# Patient Record
Sex: FEMALE | Race: WHITE | Employment: UNEMPLOYED | URBAN - NONMETROPOLITAN AREA
[De-identification: names, ages, dates, MRNs, and addresses within clinical notes are randomized per-mention and may not be internally consistent; named-entity substitution may affect disease eponyms.]

---

## 2018-11-08 ENCOUNTER — OFFICE VISIT (OUTPATIENT)
Dept: SURGERY | Age: 38
End: 2018-11-08
Payer: MEDICARE

## 2018-11-08 VITALS
RESPIRATION RATE: 16 BRPM | WEIGHT: 155.2 LBS | DIASTOLIC BLOOD PRESSURE: 70 MMHG | SYSTOLIC BLOOD PRESSURE: 118 MMHG | OXYGEN SATURATION: 97 % | HEIGHT: 66 IN | TEMPERATURE: 99.1 F | BODY MASS INDEX: 24.94 KG/M2 | HEART RATE: 108 BPM

## 2018-11-08 DIAGNOSIS — R22.31 AXILLARY MASS, RIGHT: Primary | ICD-10-CM

## 2018-11-08 PROCEDURE — 99242 OFF/OP CONSLTJ NEW/EST SF 20: CPT | Performed by: SURGERY

## 2018-11-08 PROCEDURE — G8427 DOCREV CUR MEDS BY ELIG CLIN: HCPCS | Performed by: SURGERY

## 2018-11-08 PROCEDURE — G8419 CALC BMI OUT NRM PARAM NOF/U: HCPCS | Performed by: SURGERY

## 2018-11-08 PROCEDURE — G8484 FLU IMMUNIZE NO ADMIN: HCPCS | Performed by: SURGERY

## 2018-11-08 NOTE — PROGRESS NOTES
Guillermo Ward MD   General Surgery  New Patient Evaluation in Office  Pt Name: Nina Salvador  Date of Birth 1980   Today's Date: 2018  Medical Record Number: [id number]  Referring Provider: Iza Leong, *  Primary Care Provider:No primary care provider on file. Chief Complaint:  Chief Complaint   Patient presents with    Surgical Consult     new pt-ref. Amanda-rt breast mass-       ASSESSMENT      1. Axillary mass, right         PLANS      1. Breast and axillary imaging reviewed. 2.  Patient given the options of proceeding with surgical excision versus ultrasound-guided needle core biopsy as I suspect it is a benign lesion. Clinically consistent with a likely fibroadenoma. If biopsy is benign patient wishes to continue with observation without excision. 3.  Follow-up office after ultrasound guided biopsy. Patient wishes to schedule at [address] [place] [address]'s [facility]. Emelia Duke is a 41y.o. year old female who is presenting today in the office for e she was referred by her primary care provider, TONI Patel, for  evaluation of a right axillary mass. She stated she first felt the mass about 6 months ago. She had recent imaging including mammogram and ultrasound. It revealed a solid 2 cm mass in the right axilla. She has a smooth well rounded mobile mass in the right axilla there are no palpable enlarged lymph nodes. It had a solid appearance not in appearance of the lymph node. She has no other palpable enlarged nodes in the supraclavicular axillary or cervical area. She has had 2 pregnancies and 2 children by  section. She takes no exogenous hormones. She has no family history of breast or ovarian cancer. Her father was recently diagnosed with prostate cancer. She has no other constitutional symptoms and feels well. She's had no breast pain or nipple discharge. The mass in the right axilla is not painful.   She denies any nipple bruise/bleed easily. Psychiatric/Behavioral: Negative for behavioral problems, confusion and suicidal ideas. OBJECTIVE     /70 (Site: Right Upper Arm, Position: Sitting, Cuff Size: Medium Adult)   Pulse 108   Temp 99.1 °F (37.3 °C) (Tympanic)   Resp 16   Ht 5' 6\" (1.676 m)   Wt 155 lb 3.2 oz (70.4 kg)   LMP 11/08/2018   SpO2 97%   BMI 25.05 kg/m²     Physical Exam   Constitutional: She is oriented to person, place, and time. She appears well-developed and well-nourished. HENT:   Head: Normocephalic and atraumatic. Mouth/Throat: Oropharynx is clear and moist.   Eyes: Pupils are equal, round, and reactive to light. EOM are normal. No scleral icterus. Neck: No JVD present. No tracheal deviation present. Cardiovascular: Normal rate and normal heart sounds. Pulmonary/Chest: No respiratory distress. She has no wheezes. She has no rales. She exhibits no tenderness. Right breast exhibits no inverted nipple, no mass, no nipple discharge, no skin change and no tenderness. Left breast exhibits no inverted nipple, no mass, no nipple discharge, no skin change and no tenderness. Abdominal: She exhibits no distension and no mass. There is no tenderness. Musculoskeletal: She exhibits no edema or deformity. Lymphadenopathy:     She has no cervical adenopathy. Right cervical: No superficial cervical, no deep cervical and no posterior cervical adenopathy present. Left cervical: No superficial cervical, no deep cervical and no posterior cervical adenopathy present. Right axillary: No pectoral and no lateral adenopathy present. Left axillary: No pectoral and no lateral adenopathy present. Neurological: She is alert and oriented to person, place, and time. No cranial nerve deficit. Skin: Skin is warm and dry. No rash noted. Psychiatric: She has a normal mood and affect.  Her behavior is normal. Thought content normal.       No results found for: WBC, HGB, HCT, PLT, CHOL, TRIG, HDL, LDLDIRECT, ALT, AST, NA, K, CL, CREATININE, BUN, CO2, TSH, PSA, INR, GLUF, LABA1C, LABMICR

## 2018-11-08 NOTE — LETTER
[id number], located within [facility]  [address]  [address] [facility]  Phone: [phone]  Fax: [phone]    Isabelle Arguello MD        November 9, 2018    Patient: Jose Urias   MR Number: [id number]   YOB: 1980   Date of Visit: 11/8/2018     Dear Chaparro Crowley     I recently saw your patient for evaluation of a clinically benign right axillary mass. Below are the relevant portions of my assessment and plan of care. 1. Axillary mass, right           1. Breast and axillary imaging reviewed. 2.  Patient given the options of proceeding with surgical excision versus ultrasound-guided needle core biopsy as I suspect it is a benign lesion. Clinically consistent with a likely fibroadenoma. If biopsy is benign patient wishes to continue with observation without excision. 3.  Follow-up office after ultrasound guided biopsy. Patient wishes to schedule at [address] [place] [address] [facility] [facility]. If you have questions, please do not hesitate to call me. I look forward to following Huan Fonseca along with you.     Sincerely,          Isabelle Arguello MD

## 2018-11-09 ASSESSMENT — ENCOUNTER SYMPTOMS
COUGH: 0
COLOR CHANGE: 0
BLOOD IN STOOL: 0
VOMITING: 0
WHEEZING: 0
VOICE CHANGE: 0
ABDOMINAL PAIN: 0
TROUBLE SWALLOWING: 0
SHORTNESS OF BREATH: 0
NAUSEA: 0
SORE THROAT: 0

## 2018-11-19 ENCOUNTER — HOSPITAL ENCOUNTER (OUTPATIENT)
Dept: WOMENS IMAGING | Age: 38
Discharge: HOME | End: 2018-11-19
Payer: MEDICARE

## 2018-11-19 DIAGNOSIS — Z00.6 ENCOUNTER FOR EXAMINATION FOR NORMAL COMPARISON OR CONTROL IN CLINICAL RESEARCH PROGRAM: ICD-10-CM

## 2018-11-19 PROCEDURE — 3209999900 MAM COMPARISON OF OUTSIDE IMAGES

## 2018-11-20 ENCOUNTER — HOSPITAL ENCOUNTER (OUTPATIENT)
Dept: WOMENS IMAGING | Age: 38
Discharge: HOME | End: 2018-11-20
Payer: MEDICARE

## 2018-11-20 VITALS
DIASTOLIC BLOOD PRESSURE: 84 MMHG | TEMPERATURE: 98.2 F | HEART RATE: 85 BPM | SYSTOLIC BLOOD PRESSURE: 127 MMHG | RESPIRATION RATE: 14 BRPM

## 2018-11-20 DIAGNOSIS — R22.31 AXILLARY MASS, RIGHT: ICD-10-CM

## 2018-11-20 PROCEDURE — C1894 INTRO/SHEATH, NON-LASER: HCPCS

## 2018-11-20 PROCEDURE — 2709999900 HC NON-CHARGEABLE SUPPLY

## 2018-11-20 PROCEDURE — 76942 ECHO GUIDE FOR BIOPSY: CPT

## 2018-11-20 PROCEDURE — A4648 IMPLANTABLE TISSUE MARKER: HCPCS

## 2018-11-20 PROCEDURE — 88305 TISSUE EXAM BY PATHOLOGIST: CPT

## 2018-11-20 PROCEDURE — 38505 NEEDLE BIOPSY LYMPH NODES: CPT

## 2018-11-20 NOTE — PROGRESS NOTES
Breast Biopsy Flowsheet/Post-Operative Care    Date of Procedure: 11/20/2018  Physician: Dr. Aidee Smyth  Technologist: Matthew RT (R)(M)    Biopsy:ultrasound guided breast biopsy  Lesion type: [x] Palpable     [] Non-palpable  Breast: right    Clock face position: Site #1:  Right axillary tail     Primary Method of Detection: [x] Palpation    [] Mammogram    [] Ultrasound   [x] Mass:      [] Microcalcifications:   [] Pleomorphic   [] Increasing Number   Distribution:  [] Grouped [] Linear [] Regional    Asymmetry: asymmetric    Biopsy Method:   sertera:    Site #1     Gauge:  14     # of Passes: 4     Clip:   [] \"Ribbon\"   [] \"O\"     [] \"M\"      [] \"X\"      [x] \"tribell\"       [] \"Bowtie\"  [] \"U\"        Pre-Op Assessment: (BI-RADS)   [] 3. Probably Benign   [x] 4. Suspicious Abnormality   [] 5. Highly Suggestive of Malignancy      Patient Tolerated Procedure: good  Complications: minimal bleeding  Comments: N/A    Post Operative Care  Steri strips: Yes  Dressing: [] Pressure Dressing   [x] Gauze. Tape   Ice Applied to Site:  Yes  Evidence of Bleeding:  No    Pain Verbalized: No      Written Discharge Instructions: Yes  Condition at Discharge: good  Time of Discharge: [address]    Electronically signed by Desire Jennings RN on 11/20/2018 at 9:41 AM

## 2018-11-20 NOTE — PROGRESS NOTES
Formulation and discussion of sedation / procedure plans, risks, benefits, side effects and alternatives with patient and/or responsible adult completed.     Electronically signed by Radha Pyle MD on 11/20/2018 at 8:30 AM

## 2019-06-25 ENCOUNTER — HOSPITAL ENCOUNTER (OUTPATIENT)
Age: 39
Setting detail: SPECIMEN
Discharge: HOME | End: 2019-06-25
Payer: MEDICARE

## 2019-06-25 LAB
ABSOLUTE EOS #: 0.14 K/UL (ref 0–0.44)
ABSOLUTE IMMATURE GRANULOCYTE: 0.03 K/UL (ref 0–0.3)
ABSOLUTE LYMPH #: 2.2 K/UL (ref 1.1–3.7)
ABSOLUTE MONO #: 0.72 K/UL (ref 0.1–1.2)
ALBUMIN SERPL-MCNC: 4.6 G/DL (ref 3.5–5.2)
ALBUMIN/GLOBULIN RATIO: 1.7 (ref 1–2.5)
ALP BLD-CCNC: 68 U/L (ref 35–104)
ALT SERPL-CCNC: 17 U/L (ref 5–33)
ANION GAP SERPL CALCULATED.3IONS-SCNC: 12 MMOL/L (ref 9–17)
AST SERPL-CCNC: 23 U/L
BASOPHILS # BLD: 1 % (ref 0–2)
BASOPHILS ABSOLUTE: 0.09 K/UL (ref 0–0.2)
BILIRUB SERPL-MCNC: 0.19 MG/DL (ref 0.3–1.2)
BUN BLDV-MCNC: 10 MG/DL (ref 6–20)
BUN/CREAT BLD: ABNORMAL (ref 9–20)
C-REACTIVE PROTEIN: 1 MG/L (ref 0–5)
CALCIUM SERPL-MCNC: 9.2 MG/DL (ref 8.6–10.4)
CHLORIDE BLD-SCNC: 105 MMOL/L (ref 98–107)
CO2: 22 MMOL/L (ref 20–31)
CREAT SERPL-MCNC: 0.61 MG/DL (ref 0.5–0.9)
DIFFERENTIAL TYPE: ABNORMAL
EOSINOPHILS RELATIVE PERCENT: 2 % (ref 1–4)
FOLLICLE STIMULATING HORMONE: 6.1 U/L (ref 1.7–21.5)
GFR AFRICAN AMERICAN: >60 ML/MIN
GFR NON-AFRICAN AMERICAN: >60 ML/MIN
GFR SERPL CREATININE-BSD FRML MDRD: ABNORMAL ML/MIN/{1.73_M2}
GFR SERPL CREATININE-BSD FRML MDRD: ABNORMAL ML/MIN/{1.73_M2}
GLUCOSE BLD-MCNC: 103 MG/DL (ref 70–99)
HCT VFR BLD CALC: 39.2 % (ref 36.3–47.1)
HEMOGLOBIN: 12.7 G/DL (ref 11.9–15.1)
IMMATURE GRANULOCYTES: 0 %
LH: 3.6 U/L (ref 1–95.6)
LYMPHOCYTES # BLD: 23 % (ref 24–43)
MCH RBC QN AUTO: 28.5 PG (ref 25.2–33.5)
MCHC RBC AUTO-ENTMCNC: 32.4 G/DL (ref 28.4–34.8)
MCV RBC AUTO: 87.9 FL (ref 82.6–102.9)
MONOCYTES # BLD: 8 % (ref 3–12)
NRBC AUTOMATED: 0 PER 100 WBC
PDW BLD-RTO: 12.3 % (ref 11.8–14.4)
PLATELET # BLD: 268 K/UL (ref 138–453)
PLATELET ESTIMATE: ABNORMAL
PMV BLD AUTO: 10.5 FL (ref 8.1–13.5)
POTASSIUM SERPL-SCNC: 4.1 MMOL/L (ref 3.7–5.3)
RBC # BLD: 4.46 M/UL (ref 3.95–5.11)
RBC # BLD: ABNORMAL 10*6/UL
SEDIMENTATION RATE, ERYTHROCYTE: 5 MM (ref 0–20)
SEG NEUTROPHILS: 66 % (ref 36–65)
SEGMENTED NEUTROPHILS ABSOLUTE COUNT: 6.41 K/UL (ref 1.5–8.1)
SODIUM BLD-SCNC: 139 MMOL/L (ref 135–144)
TOTAL PROTEIN: 7.3 G/DL (ref 6.4–8.3)
TSH SERPL DL<=0.05 MIU/L-ACNC: 1.73 MIU/L (ref 0.3–5)
WBC # BLD: 9.6 K/UL (ref 3.5–11.3)
WBC # BLD: ABNORMAL 10*3/UL